# Patient Record
Sex: FEMALE | Race: ASIAN
[De-identification: names, ages, dates, MRNs, and addresses within clinical notes are randomized per-mention and may not be internally consistent; named-entity substitution may affect disease eponyms.]

---

## 2018-12-18 ENCOUNTER — RESULT REVIEW (OUTPATIENT)
Age: 83
End: 2018-12-18

## 2020-07-11 PROBLEM — Z00.00 ENCOUNTER FOR PREVENTIVE HEALTH EXAMINATION: Status: ACTIVE | Noted: 2020-07-11

## 2020-07-17 ENCOUNTER — APPOINTMENT (OUTPATIENT)
Dept: GASTROENTEROLOGY | Facility: CLINIC | Age: 85
End: 2020-07-17
Payer: MEDICARE

## 2020-07-17 VITALS
HEART RATE: 82 BPM | DIASTOLIC BLOOD PRESSURE: 75 MMHG | HEIGHT: 60 IN | TEMPERATURE: 97.6 F | WEIGHT: 167 LBS | SYSTOLIC BLOOD PRESSURE: 133 MMHG | BODY MASS INDEX: 32.79 KG/M2 | OXYGEN SATURATION: 98 %

## 2020-07-17 DIAGNOSIS — Z83.3 FAMILY HISTORY OF DIABETES MELLITUS: ICD-10-CM

## 2020-07-17 DIAGNOSIS — Z78.9 OTHER SPECIFIED HEALTH STATUS: ICD-10-CM

## 2020-07-17 DIAGNOSIS — K64.8 OTHER HEMORRHOIDS: ICD-10-CM

## 2020-07-17 DIAGNOSIS — Z85.3 PERSONAL HISTORY OF MALIGNANT NEOPLASM OF BREAST: ICD-10-CM

## 2020-07-17 LAB — HEMOCCULT STL QL: NEGATIVE

## 2020-07-17 PROCEDURE — 99204 OFFICE O/P NEW MOD 45 MIN: CPT

## 2020-07-17 RX ORDER — FAMOTIDINE 40 MG/1
40 TABLET, FILM COATED ORAL
Refills: 0 | Status: ACTIVE | COMMUNITY

## 2020-07-17 RX ORDER — CHOLESTYRAMINE 4 G/9G
4 POWDER, FOR SUSPENSION ORAL DAILY
Qty: 30 | Refills: 3 | Status: ACTIVE | COMMUNITY
Start: 2020-07-17 | End: 1900-01-01

## 2020-07-17 RX ORDER — NYSTATIN 100K UNIT
100000 TABLET VAGINAL
Refills: 0 | Status: ACTIVE | COMMUNITY

## 2020-07-17 RX ORDER — OMEPRAZOLE 40 MG/1
40 CAPSULE, DELAYED RELEASE ORAL
Refills: 0 | Status: ACTIVE | COMMUNITY

## 2020-07-17 RX ORDER — ANASTROZOLE TABLETS 1 MG/1
1 TABLET ORAL
Refills: 0 | Status: ACTIVE | COMMUNITY

## 2020-07-17 RX ORDER — LISINOPRIL 20 MG/1
20 TABLET ORAL
Refills: 0 | Status: ACTIVE | COMMUNITY

## 2020-07-17 RX ORDER — APIXABAN 5 MG/1
5 TABLET, FILM COATED ORAL
Refills: 0 | Status: ACTIVE | COMMUNITY

## 2020-07-17 RX ORDER — METFORMIN HYDROCHLORIDE 500 MG/1
500 TABLET, COATED ORAL
Refills: 0 | Status: ACTIVE | COMMUNITY

## 2020-07-17 RX ORDER — ATORVASTATIN CALCIUM 10 MG/1
10 TABLET, FILM COATED ORAL
Refills: 0 | Status: ACTIVE | COMMUNITY

## 2020-07-17 RX ORDER — ASPIRIN 81 MG
81 TABLET, DELAYED RELEASE (ENTERIC COATED) ORAL
Refills: 0 | Status: ACTIVE | COMMUNITY

## 2020-07-17 NOTE — ASSESSMENT
[FreeTextEntry1] : Diarrhea: The patient complains of diarrhea.  I recommend a low residue diet. The patient is to avoid fiber supplementation. The patient is to consider starting a trial of a probiotic such as Align once a day.   I recommend sending stool studies for C+S, O+P x3, and C. difficile to assess for an infectious etiology of the diarrhea.  The symptoms are worse after meals.   I recommend a trial of cholestyramine one packet once a day for possible bile induced diarrhea. If the symptoms persist, the patient may require a colonoscopy to assess for colitis versus other causes.  The patient was told of the risks and benefits of the procedure.  The patient was told of the risks of perforation, emergency surgery, bleeding, infections and missed lesions.  The patient agreed and will follow-up to reassess the symptoms.  \par Diverticulosis: I recommend a low residue diet and avoid seeds. The patient is to consider a trial of Metamucil once a day for fiber supplementation. The patient is to also consider a trial of a probiotic such as Align once a day. \par Internal Hemorrhoids: The patient is to consider a trial of Anusol H. C. suppositories one per rectum nightly and Anusol HC2 .5% cream apply to affected area twice a day p.r.n. hemorrhoidal bleeding or pain. \par Follow-up: The patient is to follow-up in the office in 4 weeks to reassess the symptoms. The patient was told to call the office if any further problems. \par \par

## 2020-07-17 NOTE — HISTORY OF PRESENT ILLNESS
[None] : had no significant interval events [Heartburn] : denies heartburn [Nausea] : denies nausea [Vomiting] : denies vomiting [Constipation] : denies constipation [Yellow Skin Or Eyes (Jaundice)] : denies jaundice [Abdominal Pain] : denies abdominal pain [Abdominal Swelling] : denies abdominal swelling [Rectal Pain] : denies rectal pain [Wt Loss ___ Lbs] : recent [unfilled] ~Upound(s) weight loss [Diarrhea] : diarrhea [Appendectomy] : appendectomy [Wt Gain ___ Lbs] : no recent weight gain [GERD] : no gastroesophageal reflux disease [Hiatus Hernia] : no hiatus hernia [Peptic Ulcer Disease] : no peptic ulcer disease [Pancreatitis] : no pancreatitis [Cholelithiasis] : no cholelithiasis [Kidney Stone] : no kidney stone [Inflammatory Bowel Disease] : no inflammatory bowel disease [Irritable Bowel Syndrome] : no irritable bowel syndrome [Diverticulitis] : no diverticulitis [Alcohol Abuse] : no alcohol abuse [Malignancy] : no malignancy [Abdominal Surgery] : no abdominal surgery [Cholecystectomy] : no cholecystectomy [de-identified] : The patient had a colonoscopy to the cecum  performed at the office on October 2, 2012.  The study had some retained solid and liquid stool scattered throughout the colon but no gross lesions were noted.   The findings revealed severe left-sided diverticulosis, a fixed recto-sigmoid colon and internal hemorrhoids.  There were no polyps, masses, AVMs or colitis noted.  The patient tolerated the procedure well.  [de-identified] : The patient is a 85-year-old  female with past medical history significant for breast cancer, s/p right mastectomy, pacemaker, peripheral vascular disease, s/p left lower extremity stent placement, hypertension, hypercholesterolemia, diabetes mellitus and coronary artery disease, s/p cardiac stent placement on Eliquis who was referred to my office by Dr. Anand Madrigal for diarrhea, change in bowel habits and change in caliber of stool. I was asked to render an opinion for consultation for the above complaints.   The patient states that she is feeling uncomfortable.  The patient denies any abdominal pain.  The patient denies any abdominal gas and bloating.  The patient denies any nausea or vomiting.  The patient denies any gastroesophageal reflux disease or dysphagia. The patient denies any atypical chest pain, shortness of breath or palpitations.  The patient denies any diaphoresis. The patient complains of diarrhea but denies any constipation.  The patient has 1 bowel movement a day.  The patient complains of a change in bowel habits.  The patient complains of a change in caliber of stool.  The diarrhea is described as soft in nature.  The patient denies having mucus discharge with the bowel movements.  The patient denies any bright red blood per rectum, melena or hematemesis.  The patient denies any rectal pain or rectal pruritus. The patient complains of weight loss but denies any anorexia.  The patient admits to losing 40 pounds over the past 3 years.  The patient admits to dieting and exercise.  She denies any fevers or chills.  The patient denies any jaundice or pruritus.  The patient denies any back pain.  The blood work performed on 2020 revealed a low CO2 of 21 mmol/L, an elevated BUN/creatinine of 30/1.29 mg/dl, respectively, a low GFR of 39 ml/min/1.73 M2, a low alkaline phosphatase of 38 U/L and elevated glucose of 105 mg/dl.  The patient had a colonoscopy to the cecum  performed at the office on 2012.  The study had some retained solid and liquid stool scattered throughout the colon but no gross lesions were noted.   The findings revealed severe left-sided diverticulosis, a fixed recto-sigmoid colon and internal hemorrhoids.  There were no polyps, masses, AVMs or colitis noted.  The patient tolerated the procedure well. The patient denies having a recent prior upper endoscopy and colonoscopy performed by another gastroenterologist.   The patient's last menstrual period was age 59.  The patient is a .  The patient's first menstrual period was at age 17. The patient denies any significant family history of GI problems.

## 2020-07-17 NOTE — REVIEW OF SYSTEMS
[Feeling Tired] : feeling tired [Eyesight Problems] : eyesight problems [SOB on Exertion] : shortness of breath during exertion [Diarrhea] : diarrhea [Negative] : Heme/Lymph [de-identified] : brown

## 2020-07-21 LAB
G LAMBLIA AG STL QL: NORMAL
RV AG STL QL IA: NORMAL

## 2020-07-22 LAB — DEPRECATED O AND P PREP STL: NORMAL

## 2020-07-23 LAB — BACTERIA STL CULT: NORMAL

## 2020-07-27 LAB — E HISTOLYT AG STL IA-ACNC: NOT DETECTED

## 2020-07-28 LAB
FAT STL QN: NORMAL
FAT STL QN: NORMAL

## 2020-08-17 ENCOUNTER — APPOINTMENT (OUTPATIENT)
Dept: GASTROENTEROLOGY | Facility: CLINIC | Age: 85
End: 2020-08-17
Payer: MEDICARE

## 2020-08-17 VITALS
HEIGHT: 60 IN | BODY MASS INDEX: 32.39 KG/M2 | WEIGHT: 165 LBS | TEMPERATURE: 98 F | HEART RATE: 76 BPM | DIASTOLIC BLOOD PRESSURE: 73 MMHG | OXYGEN SATURATION: 100 % | SYSTOLIC BLOOD PRESSURE: 134 MMHG

## 2020-08-17 DIAGNOSIS — K57.90 DIVERTICULOSIS OF INTESTINE, PART UNSPECIFIED, W/OUT PERFORATION OR ABSCESS W/OUT BLEEDING: ICD-10-CM

## 2020-08-17 DIAGNOSIS — R19.7 DIARRHEA, UNSPECIFIED: ICD-10-CM

## 2020-08-17 PROCEDURE — 99213 OFFICE O/P EST LOW 20 MIN: CPT

## 2020-08-17 RX ORDER — CHOLESTYRAMINE 4 G/9G
4 POWDER, FOR SUSPENSION ORAL DAILY
Qty: 30 | Refills: 3 | Status: ACTIVE | COMMUNITY
Start: 2020-08-17 | End: 1900-01-01

## 2020-08-17 NOTE — HISTORY OF PRESENT ILLNESS
[Nausea] : denies nausea [None] : had no significant interval events [Heartburn] : denies heartburn [Abdominal Swelling] : denies abdominal swelling [Yellow Skin Or Eyes (Jaundice)] : denies jaundice [Vomiting] : denies vomiting [Abdominal Pain] : denies abdominal pain [Constipation] : denies constipation [Rectal Pain] : denies rectal pain [Wt Gain ___ Lbs] : recent [unfilled] ~Upound(s) weight gain [Diarrhea] : diarrhea [Cholelithiasis] : cholelithiasis [Appendectomy] : appendectomy [Cholecystectomy] : cholecystectomy [Wt Loss ___ Lbs] : no recent weight loss [GERD] : no gastroesophageal reflux disease [Peptic Ulcer Disease] : no peptic ulcer disease [Hiatus Hernia] : no hiatus hernia [Kidney Stone] : no kidney stone [Pancreatitis] : no pancreatitis [Inflammatory Bowel Disease] : no inflammatory bowel disease [Diverticulitis] : no diverticulitis [Irritable Bowel Syndrome] : no irritable bowel syndrome [Abdominal Surgery] : no abdominal surgery [Malignancy] : no malignancy [Alcohol Abuse] : no alcohol abuse [de-identified] : (+) prior smoking 1 PPD x 50 years stopped x 3 years, (-) ETOH, (-) IVDA\par  [de-identified] : The patient states that she is feeling fine. The patient denies any jaundice or pruritus.  The patient denies any lower back pain. The patient denies any abdominal pain.  The patient denies any abdominal gas and bloating.  The patient denies any nausea or vomiting.  The patient denies any gastroesophageal reflux disease or dysphagia.  The patient denies any atypical chest pain, shortness of breath or palpitations.  The patient denies any diaphoresis. The patient previously complained of diarrhea that resolved with Cholestyramine.  She currently denies any constipation or diarrhea.  The patient has 1 to 3 bowel movements a day.  The patient denies a change in bowel habits.  The patient denies a change in caliber of stool.  The patient denies having mucus discharge with the bowel movements.  The patient denies any bright red blood per rectum, melena or hematemesis.  The patient denies any rectal pain or rectal pruritus.  The patient denies any weight loss or anorexia.  The patient admits to gaining weight recently.  She denies any fevers or chills.  The stool studies performed on July 24, 2020 were negative for culture and sensitivity, ova and parasite, C. difficile toxin E. histolytica antigen EIA, Giardia lamblia antigen and rotavirus antigen stool by EIA. The stool was negative for guaiac was negative for occult blood.  The patient has a history significant for breast cancer, s/p right mastectomy, pacemaker, peripheral vascular disease, s/p left lower extremity stent placement, hypertension, hypercholesterolemia, diabetes mellitus and coronary artery disease, s/p cardiac stent placement on Eliquis. The blood work performed on June 17, 2020 revealed a low CO2 of 21 mmol/L, an elevated BUN/creatinine of 30/1.29 mg/dl, respectively, a low GFR of 39 ml/min/1.73 M2, a low alkaline phosphatase of 38 U/L and elevated glucose of 105 mg/dl. The patient had a colonoscopy to the cecum performed at the office on October 2, 2012. The study had some retained solid and liquid stool scattered throughout the colon but no gross lesions were noted. The findings revealed severe left-sided diverticulosis, a fixed recto-sigmoid colon and internal hemorrhoids. There were no polyps, masses, AVMs or colitis noted. The patient tolerated the procedure well. The patient denies having a recent prior upper endoscopy and colonoscopy performed by another gastroenterologist. The patient denies any significant family history of GI problems.

## 2020-08-17 NOTE — ASSESSMENT
[FreeTextEntry1] : Diarrhea: The diarrhea improved on Cholestyramine.  I recommend a trial of cholestyramine one packet once a day for possible bile induced diarrhea. If the symptoms persist, the patient may require a colonoscopy to assess for colitis versus other causes. The patient was told of the risks and benefits of the procedure. The patient was told of the risks of perforation, emergency surgery, bleeding, infections and missed lesions. The patient agreed and will follow-up to reassess the symptoms. \par Diverticulosis: I recommend a low residue diet and avoid seeds. The patient is to consider a trial of Metamucil once a day for fiber supplementation. The patient is to also consider a trial of a probiotic such as Align once a day. \par Internal Hemorrhoids: The patient is to consider a trial of Anusol H. C. suppositories one per rectum nightly and Anusol HC2.5% cream apply to affected area twice a day p.r.n. hemorrhoidal bleeding or pain. \par Follow-up: The patient is to follow-up in the office in 4 weeks to reassess the symptoms. The patient was told to call the office if any further problems. \par

## 2021-02-17 ENCOUNTER — APPOINTMENT (OUTPATIENT)
Dept: GASTROENTEROLOGY | Facility: CLINIC | Age: 86
End: 2021-02-17